# Patient Record
Sex: MALE
[De-identification: names, ages, dates, MRNs, and addresses within clinical notes are randomized per-mention and may not be internally consistent; named-entity substitution may affect disease eponyms.]

---

## 2023-12-28 PROBLEM — Z00.00 ENCOUNTER FOR PREVENTIVE HEALTH EXAMINATION: Status: ACTIVE | Noted: 2023-12-28

## 2024-01-04 ENCOUNTER — APPOINTMENT (OUTPATIENT)
Dept: ORTHOPEDIC SURGERY | Facility: CLINIC | Age: 44
End: 2024-01-04
Payer: OTHER MISCELLANEOUS

## 2024-01-04 ENCOUNTER — NON-APPOINTMENT (OUTPATIENT)
Age: 44
End: 2024-01-04

## 2024-01-04 DIAGNOSIS — S16.1XXA STRAIN OF MUSCLE, FASCIA AND TENDON AT NECK LEVEL, INITIAL ENCOUNTER: ICD-10-CM

## 2024-01-04 PROCEDURE — 99213 OFFICE O/P EST LOW 20 MIN: CPT | Mod: ACP

## 2024-01-04 PROCEDURE — 72040 X-RAY EXAM NECK SPINE 2-3 VW: CPT

## 2024-01-04 PROCEDURE — 73030 X-RAY EXAM OF SHOULDER: CPT | Mod: LT

## 2024-01-04 RX ORDER — NAPROXEN 500 MG/1
TABLET ORAL
Refills: 0 | Status: ACTIVE | COMMUNITY

## 2024-01-04 NOTE — HISTORY OF PRESENT ILLNESS
[de-identified] : 43-year-old male comes in today for an evaluation of his left shoulder and arm pain.  He had an injury at work in November.  He works as a  for [employer].  He states that he lifted a heavy box and his left arm bent back.  He also complains of numbness and tingling in his fourth and fifth digit occasionally in the first second and third digit.  He states he had prior surgery on his left shoulder in 2021.  He went to an urgent care at the time of the injury, he was prescribed muscle relaxer Flexeril, he is also taking naproxen

## 2024-01-04 NOTE — IMAGING
[de-identified] : On examination of the left shoulder no swelling, no ecchymosis, no erythema.  Skin is intact.  He describes pain diffusely to the upper arm.  Has tenderness to the anterior shoulder around the proximal biceps insertion.  He is able to actively forward flex and abduct with slight restriction.  Positive Hurtado, positive [place]'s with weakness.  On examination of the cervical spine he has tenderness over the cervical vertebra, tenderness to the left trapezius, tenderness around the medial border of the left scapula.  He complains of subjective numbness and tingling rating down the arm into the fourth and fifth digit, up occasionally in the first second and third digit.  On examination of his left elbow there is no swelling, no ecchymosis, no erythema.  Skin is intact.  No tenderness over the epicondyles.  He has no tenderness over the distal bicep tendon, it is palpated on exam.  X-ray left shoulder no obvious fracture or dislocation, irregularity at the clavicle most likely from prior surgery distal clavicle excision X-ray cervical spine straightening to the cervical curvature, multilevel degenerative change

## 2024-01-04 NOTE — ASSESSMENT
[FreeTextEntry1] : At this time recommended conservative treatment.  We did discuss physical therapy for his cervical spine and shoulder.  Will put in a request recommending an MRI of the left shoulder and cervical spine given the numbness in his left arm.  He will follow-up in our sports medicine and spine department for further management after MRIs.  Patient is currently working full duty.  He is taking naproxen and muscle relaxer.

## 2024-02-01 ENCOUNTER — APPOINTMENT (OUTPATIENT)
Dept: ORTHOPEDIC SURGERY | Facility: CLINIC | Age: 44
End: 2024-02-01
Payer: OTHER MISCELLANEOUS

## 2024-02-01 PROCEDURE — 99204 OFFICE O/P NEW MOD 45 MIN: CPT | Mod: 25

## 2024-02-01 PROCEDURE — 20611 DRAIN/INJ JOINT/BURSA W/US: CPT | Mod: LT

## 2024-02-01 NOTE — HISTORY OF PRESENT ILLNESS
[de-identified] : Patient is here for evaluation of left shoulder pain Affecting quality of life Has pain and weakness with loss of rom Wakes up at night due to pain  WC injury  Pain with shoulder rom, also neck pain  NAD Left shoulder: TTP ant GH joint, bicipital groove FF 0-150 (passive 175) ER 40 IR L5 Pain with terminal rom Weakness to abduction and ER Pos Impingement Pos Gutierrez Pos Cross Arm Adduction Negative instability Positive scapula dyskinesia  XRay Left shoulder negative for fracture, dislocation, arthritis, h/o DCE  Plan went over findings explained imaging needs mri left shoulder due to pain, left shoulder injection is scheduled to see ortho spine for his neck fu in 4-6 weeks  Large Joint Injection was performed because of pain/rom. Anesthesia: ethyl chloride sprayed topically. Dexamethasone 2 cc of 4mg.   Lidocaine: 2 cc of 1%  Medication was injected in the left shoulder. Patient has tried OTC's including aspirin, Ibuprofen, Aleve etc or prescription NSAIDS, and/or exercises at home and/ or physical therapy without satisfactory response. After verbal consent using sterile preparation and technique. The risks, benefits, and alternatives to cortisone injection were explained in full to the patient. Risks outlined include but are not limited to infection, sepsis, bleeding, scarring, skin discoloration, temporary increase in pain, syncopal episode, failure to resolve symptoms, allergic reaction, symptom recurrence, and elevation of blood sugar in diabetics. Patient understood the risks. All questions were answered. After discussion of options, patient requested an injection. Oral informed consent was obtained and sterile prep was done of the injection site. Sterile technique was utilized for the procedure including the preparation of the solutions used for the injection. Patient tolerated the procedure well. Advised to ice the injection site this evening. Prep with alcohol locally to site. Sterile technique used. Diagnostic ultrasound was performed of the shoulder to confirm.

## 2024-02-14 ENCOUNTER — APPOINTMENT (OUTPATIENT)
Dept: ORTHOPEDIC SURGERY | Facility: CLINIC | Age: 44
End: 2024-02-14
Payer: OTHER MISCELLANEOUS

## 2024-02-14 DIAGNOSIS — M54.12 RADICULOPATHY, CERVICAL REGION: ICD-10-CM

## 2024-02-14 PROCEDURE — 99214 OFFICE O/P EST MOD 30 MIN: CPT

## 2024-02-14 NOTE — IMAGING
[de-identified] : TTP midline cervical spine and paraspinal musculature   Strength                                                                     Deltoid   Right: 5/5; Left: 5/5                      Biceps   Right: 5/5; Left: 5/5                   Triceps        Right: 5/5; Left: 5/5                                 Wrist Extensors     Right: 5/5; Left: 5/5 Finger Flexors     Right: 5/5; Left: 5/5 IO    Right: 5/5; Left: 5/5  Sensation C5   Right: 2/2; Left: 2/2 C6   Right: 2/2; Left: 2/2 C7   Right: 2/2; Left: 2/2 C8   Right: 2/2; Left: 2/2 T1   Right: 2/2; Left: 2/2  Reflexes Biceps   Right: 2+; Left 2+ Triceps   Right: 2+; Left 2+ Michel's  Right: Negative; L: Negative

## 2024-02-14 NOTE — HISTORY OF PRESENT ILLNESS
[de-identified] : 41-year-old male presents to me with a work-related injury.  This happened in November 2023.  The patient was lifting a box.  He has neck pain that is fairly severe.  It radiates down his entire left arm he feels it through his bicep forearm and goes into the small finger ring finger with numbness and tingling.  No loss of bladder or bowel.  He does state that his balance is off and his hand is clumsy and he is dropping things.  He has not been to pain management and does have an MRI has not done physical therapy he is taking naproxen he is also tried a Medrol Dosepak.

## 2024-02-14 NOTE — DATA REVIEWED
[FreeTextEntry1] : I reviewed the patient's MRI of the cervical spine with him.  The patient has diffuse mild foraminal stenosis.  No spinal cord compression.

## 2024-02-14 NOTE — DISCUSSION/SUMMARY
[de-identified] : 41-year-old male presents cervical radiculopathy after work-related accident November 2023.  The patient's MRI imaging shows some very mild foraminal narrowing at multiple different levels with the worst being perhaps at C7-T1.  I think the patient will do well with conservative care I am having him follow-up with Dr. Liriano from interventional pain management to discuss cervical epidural steroid injections and any other type of injections.  I am also giving him a prescription for physical therapy.  Patient will follow-up as needed.

## 2024-02-23 ENCOUNTER — APPOINTMENT (OUTPATIENT)
Dept: ORTHOPEDIC SURGERY | Facility: CLINIC | Age: 44
End: 2024-02-23
Payer: OTHER MISCELLANEOUS

## 2024-02-23 DIAGNOSIS — S46.912A STRAIN OF UNSPECIFIED MUSCLE, FASCIA AND TENDON AT SHOULDER AND UPPER ARM LEVEL, LEFT ARM, INITIAL ENCOUNTER: ICD-10-CM

## 2024-02-23 PROCEDURE — 99214 OFFICE O/P EST MOD 30 MIN: CPT

## 2024-02-25 PROBLEM — S46.912A STRAIN OF LEFT SHOULDER, INITIAL ENCOUNTER: Status: ACTIVE | Noted: 2024-01-04

## 2024-02-25 NOTE — HISTORY OF PRESENT ILLNESS
[de-identified] : Patient is here for evaluation of left shoulder pain Affecting quality of life Has pain and weakness with loss of rom Wakes up at night due to pain  WC injury  Pain with shoulder rom, also neck pain  h/o left shoulder arthroscopy and had a suprapec tenodesis at that time  NAD Left shoulder: TTP ant GH joint, bicipital groove FF 0-150 (passive 175) ER 40 IR L5 Pain with terminal rom Weakness to abduction and ER Pos Impingement Pos Gutierrez Pos Cross Arm Adduction Negative instability Positive scapula dyskinesia  XRay Left shoulder negative for fracture, dislocation, arthritis, h/o DCE  mri left shoulder: RC tendinitis, ac arthritis, impingement, chronic biceps tear  Plan went over findings explained imaging explained mri and previous surgery due to cont pain and limitations despite cons tx and injections, will proceed with surgery op vs nonop and r/b/a explained  left shoulder arthroscopy, debridement, decompression, lysis of adhesions  Operative and nonoperative options discussed with patient. Surgical risks, benefits, and alternatives explained. Surgical risks include but are not exclusive to bleeding, infection, neurovascular damage, continued pain, stiffness, scarring, rsd, dvt/pe, potential failure of surgery that may require further surgery in the future. I went over incisions and rehabilitation. All questions answered.